# Patient Record
Sex: MALE | NOT HISPANIC OR LATINO | URBAN - METROPOLITAN AREA
[De-identification: names, ages, dates, MRNs, and addresses within clinical notes are randomized per-mention and may not be internally consistent; named-entity substitution may affect disease eponyms.]

---
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
## 2025-01-10 ENCOUNTER — TELEPHONE (OUTPATIENT)
Dept: UROLOGY | Facility: CLINIC | Age: 20
End: 2025-01-10

## 2025-01-10 ENCOUNTER — OFFICE VISIT (OUTPATIENT)
Dept: UROLOGY | Facility: CLINIC | Age: 20
End: 2025-01-10
Payer: COMMERCIAL

## 2025-01-10 VITALS
HEIGHT: 73 IN | HEART RATE: 93 BPM | WEIGHT: 279.4 LBS | BODY MASS INDEX: 37.03 KG/M2 | DIASTOLIC BLOOD PRESSURE: 76 MMHG | OXYGEN SATURATION: 98 % | SYSTOLIC BLOOD PRESSURE: 116 MMHG

## 2025-01-10 DIAGNOSIS — N48.1 BALANITIS: Primary | ICD-10-CM

## 2025-01-10 PROCEDURE — 99213 OFFICE O/P EST LOW 20 MIN: CPT

## 2025-01-10 RX ORDER — CLOTRIMAZOLE 1 %
CREAM (GRAM) TOPICAL 2 TIMES DAILY
Qty: 12 G | Refills: 1 | Status: SHIPPED | OUTPATIENT
Start: 2025-01-10

## 2025-01-10 NOTE — PROGRESS NOTES
Office Visit- Urology  Celso Middleton 2005 MRN: [id number]      Assessment/Discussion/Plan    19 y.o. male managed by     Foreskin concern  -Patient was previously seen in 2023 due to difficulty retracting foreskin.  On physical exam at that point in time no overt abnormality was appreciated elective circumcision was discussed  the patient decided to observe  -However he states he has had 2 episodes where he retracted the foreskin and then had difficulty with reducing it back over the glans.  He states the last episode happened in was persistent for 2 days at the foreskin swelled.  From patient description this seems to be consistent with a paraphimosis   -on exam today there seems to be no significant difficulty with retracting or reducing foreskin.  There is whitish discharge noted circumferentially along the coronal sulcus.  Patient states that he took a shower just before this and clean the area.  Possible yeast/candidal infection?  Patient not diabetic will trial a course of clotrimazole.  Advised on dietary factors and hygienic measures  -Patient interested in surgery.  Discussed circumcision,  postop expectations, risks (outlined bleeding, infection, sensation changes, cosmetic changes/scarring, removal of too much or too little tissue, need for additional procedures), and informed consent was signed Patient also states that when the foreskin is pulled back there can be associated pain.  Discussed possible implication  of the frenulum.  Offered evaluation with surgeon to have in-depth discussion given patient's interest in  surgery and whether frenulum that would be involved or not but patient was ready to commit to proceeding with circumcision at today's appointment.  However after speak with the surgical coordinator there would be no availability for a circumcision at the requested location and within the timing patient is requesting before he returns back to school.  Thus we will have patient return to  meet with surgeon when he returns from school in may.  He is aware that he has difficulty of producing a retracted foreskin back over the glans in the future that this can represent a urologic emergency and should either make our office aware presents to the ER  -Follow-up with surgeon in May       Chief Complaint:   Celso is a 19 y.o. male presenting to the office for a follow up visit regarding  foreskin concern        Subjective    Hx 6/16/2023  -18-year-old male who presents with his father  -Concerned about foreskin   -States that he has difficulty pulling the foreskin all the way back and that he is associated pain and discomfort as well as the fact that he senses difficulty reducing the foreskin back over glans  -He also felt like his urethra evaluated because on the right side there is more visibility as opposed to the left side of the mucosal tissue  -He has no urinary symptoms whatsoever: No dysuria, gross hematuria, frequency, urgency, obstructive urinary symptoms  -He denies any penile lesions  -He states for hygiene in the shower he retracts foreskin cleaned with soapy water and reduce his foreskin back over the glans    Hx 1/10/2024    Patient presents to the office today accompanied by his mother.  He states that he is having persistent concerns about his foreskin.  He notes 2 episodes where he had retracted the foreskin but then had difficulty with reducing a back over the glans the last episode lasted for about 2 days and he did note swelling of the glans/foreskin.  He states they believe that sometimes the glans is too large for him to be able to easily retract or reduce the foreskin.  No skin color changes.  No lesions that he have noticed.  He denies any penile pain.  Sometimes with retraction of foreskin there is tightness in the bottom aspect of the penis.  Had hemoglobin A1c in March that returned within normal limits    ROS:   Review of Systems   Constitutional: Negative.  Negative for chills,  fatigue and fever.   HENT: Negative.     Respiratory:  Negative for shortness of breath.    Cardiovascular:  Negative for chest pain.   Gastrointestinal: Negative.  Negative for abdominal pain.   Endocrine: Negative.    Musculoskeletal: Negative.    Skin: Negative.    Neurological: Negative.  Negative for dizziness and light-headedness.   Hematological: Negative.    Psychiatric/Behavioral: Negative.           Past Medical History  Past Medical History:   Diagnosis Date    Allergic     Asthma     Mild intermittent asthma without complication        Past Surgical History  Past Surgical History:   Procedure Laterality Date    NASAL SEPTUM SURGERY      WISDOM TOOTH EXTRACTION         Past Family History  Family History   Problem Relation Age of Onset    No Known Problems Mother     Diabetes type II Father     Hyperlipidemia Father     Hypertension Father     Diabetes Father     Heart disease Paternal Grandfather     Diabetes Paternal Grandmother        Past Social history  Social History     Socioeconomic History    Marital status: Single     Spouse name: Not on file    Number of children: Not on file    Years of education: Not on file    Highest education level: Not on file   Occupational History    Not on file   Tobacco Use    Smoking status: Never    Smokeless tobacco: Never   Vaping Use    Vaping status: Never Used   Substance and Sexual Activity    Alcohol use: Never    Drug use: Never    Sexual activity: Never     Partners: Female   Other Topics Concern    Not on file   Social History Narrative    Not on file     Social Drivers of Health     Financial Resource Strain: Not on file   Food Insecurity: Not on file   Transportation Needs: Not on file   Physical Activity: Not on file   Stress: Not on file   Social Connections: Not on file   Intimate Partner Violence: Not on file   Housing Stability: Not on file       Current Medications  Current Outpatient Medications   Medication Sig Dispense Refill    albuterol  (PROVENTIL HFA,VENTOLIN HFA) 90 mcg/act inhaler Inhale 2 puffs      Loratadine (Claritin) 10 MG CAPS        No current facility-administered medications for this visit.       Allergies  Allergies   Allergen Reactions    Pollen Extract Allergic Rhinitis       OBJECTIVE    Vitals   There were no vitals filed for this visit.    PVR:    Physical Exam  Constitutional:       General: He is not in acute distress.     Appearance: Normal appearance. He is normal weight. He is not ill-appearing or toxic-appearing.   HENT:      Head: Normocephalic and atraumatic.   Eyes:      Conjunctiva/sclera: Conjunctivae normal.   Cardiovascular:      Rate and Rhythm: Normal rate.   Pulmonary:      Effort: Pulmonary effort is normal. No respiratory distress.   Genitourinary:     Comments: Uncircumcised phallus.  No lesions noted on foreskin.  Foreskin is able to be reduced without difficulty.  Glans was examined with no lesions.  Did have whitish discharge circumferentially around the coronal sulcus or immediately behind it.  Skin:     General: Skin is warm and dry.   Neurological:      General: No focal deficit present.      Mental Status: He is alert and oriented to person, place, and time.      Cranial Nerves: No cranial nerve deficit.   Psychiatric:         Mood and Affect: Mood normal.         Behavior: Behavior normal.         Thought Content: Thought content normal.          Labs:     Lab Results   Component Value Date    CREATININE 0.90 03/14/2024      Lab Results   Component Value Date    HGBA1C 5.4 03/14/2024     Lab Results   Component Value Date    CALCIUM 9.9 03/14/2024    K 4.3 03/14/2024    CO2 29 03/14/2024     03/14/2024    BUN 9 03/14/2024    CREATININE 0.90 03/14/2024       I have personally reviewed all pertinent lab results and reviewed with patient    Imaging       Scot Hernandez PA-C  Date: 1/10/2025 Time: 11:01 AM  [facility]    This note was written using fluency dictation software.  Please excuse any resulting minor grammatical errors.

## 2025-04-29 ENCOUNTER — OFFICE VISIT (OUTPATIENT)
Dept: UROLOGY | Facility: CLINIC | Age: 20
End: 2025-04-29
Payer: COMMERCIAL

## 2025-04-29 VITALS
HEIGHT: 73 IN | BODY MASS INDEX: 38.3 KG/M2 | WEIGHT: 289 LBS | OXYGEN SATURATION: 99 % | SYSTOLIC BLOOD PRESSURE: 124 MMHG | HEART RATE: 83 BPM | DIASTOLIC BLOOD PRESSURE: 80 MMHG

## 2025-04-29 DIAGNOSIS — N47.8 FORESKIN PROBLEM: Primary | ICD-10-CM

## 2025-04-29 PROCEDURE — 99213 OFFICE O/P EST LOW 20 MIN: CPT | Performed by: UROLOGY

## 2025-04-29 NOTE — PROGRESS NOTES
Celso Middleton is a(n) 20 y.o. male. , :  2005    Subjective     Assessment:  The encounter diagnosis was Foreskin problem.  No need for circ despite couple episodes of paraphimosis.   Here with mom for all of the visit except the brief exam. Unsure why the events occurred,.  Not sure why rarely has frenular pain.  Physical exam today was unremarkable.  Explained to the patient and the mother that circumcision has not in and of itself a complicated procedure.  The issues are more related to postoperative hyperesthesia of the glans and possible meatal stenosis which occurs after circumcision in young adults.    Plan:  Patient prefers to hold on circ.  Due to the risks of hyperesthesia and meatal stenosis and he will call if the paraphimosis recurs.  Same as true for the frenular discomfort.  With nothing physically visible, generally operating on some area that is so sensitive leads to more discomfort.     Radiology  none    Labs   nnoe    Past Medical History  none    Past  History  Paraphimosis, unexplained    Past  surgical history   none    Prior Visits  Hx 2023  -18-year-old male who presents with his father  -Concerned about foreskin   -States that he has difficulty pulling the foreskin all the way back and that he is associated pain and discomfort as well as the fact that he senses difficulty reducing the foreskin back over glans  -He also felt like his urethra evaluated because on the right side there is more visibility as opposed to the left side of the mucosal tissue  -He has no urinary symptoms whatsoever: No dysuria, gross hematuria, frequency, urgency, obstructive urinary symptoms  -He denies any penile lesions  -He states for hygiene in the shower he retracts foreskin cleaned with soapy water and reduce his foreskin back over the glans     Hx 1/10/2024     Patient presents to the office today accompanied by his mother.  He states that he is having persistent concerns about his foreskin.  " He notes 2 episodes where he had retracted the foreskin but then had difficulty with reducing a back over the glans the last episode lasted for about 2 days and he did note swelling of the glans/foreskin.  He states they believe that sometimes the glans is too large for him to be able to easily retract or reduce the foreskin.  No skin color changes.  No lesions that he have noticed.  He denies any penile pain.  Sometimes with retraction of foreskin there is tightness in the bottom aspect of the penis.  Had hemoglobin A1c in March that returned within normal limits       5/2/2025 OV Logan Duron  Here for evaluation and for discussion about possible circumcision.  Physical examination performed.  No evidence of paraphimosis or phimosis.  No frenular masses present.  No tenderness or redness.    Review of Systems    No results found for: \"PSA\"  No results found for: \"TESTOSTERONE\"  No components found for: \"CR\"  No results found for: \"HBA1C\"    Objective     /80 (BP Location: Left arm, Patient Position: Sitting, Cuff Size: Adult)   Pulse 83   Ht 6' 1\" (1.854 m)   Wt 131 kg (289 lb)   SpO2 99%   BMI 38.13 kg/m²     Physical Exam  Cardiovascular:      Rate and Rhythm: Normal rate.   Genitourinary:     Penis: Normal.       Comments: Uncircumcised.  Foreskin comes back nicely.  Has pearly penile papules.  Neurological:      Mental Status: He is alert.           Logan Duron [facility]  "

## 2025-05-02 PROBLEM — N47.8 FORESKIN PROBLEM: Status: ACTIVE | Noted: 2025-05-02

## 2025-06-15 ENCOUNTER — PATIENT MESSAGE (OUTPATIENT)
Dept: FAMILY MEDICINE CLINIC | Facility: CLINIC | Age: 20
End: 2025-06-15

## 2025-06-18 ENCOUNTER — OFFICE VISIT (OUTPATIENT)
Dept: FAMILY MEDICINE CLINIC | Facility: CLINIC | Age: 20
End: 2025-06-18
Payer: COMMERCIAL

## 2025-06-18 ENCOUNTER — TELEPHONE (OUTPATIENT)
Dept: FAMILY MEDICINE CLINIC | Facility: CLINIC | Age: 20
End: 2025-06-18

## 2025-06-18 VITALS
SYSTOLIC BLOOD PRESSURE: 118 MMHG | HEART RATE: 82 BPM | HEIGHT: 73 IN | WEIGHT: 297 LBS | DIASTOLIC BLOOD PRESSURE: 80 MMHG | OXYGEN SATURATION: 97 % | BODY MASS INDEX: 39.36 KG/M2

## 2025-06-18 DIAGNOSIS — E66.9 OBESITY (BMI 30-39.9): Primary | ICD-10-CM

## 2025-06-18 DIAGNOSIS — Z78.9 WEIGHT LOSS ADVISED: ICD-10-CM

## 2025-06-18 DIAGNOSIS — R68.89 DIFFICULTY LOSING WEIGHT: ICD-10-CM

## 2025-06-18 DIAGNOSIS — J45.20 MILD INTERMITTENT ASTHMA WITHOUT COMPLICATION: ICD-10-CM

## 2025-06-18 DIAGNOSIS — E66.9 OBESITY (BMI 30-39.9): ICD-10-CM

## 2025-06-18 PROCEDURE — 99213 OFFICE O/P EST LOW 20 MIN: CPT | Performed by: FAMILY MEDICINE

## 2025-06-18 RX ORDER — SEMAGLUTIDE 0.25 MG/.5ML
INJECTION, SOLUTION SUBCUTANEOUS
Qty: 2 ML | Refills: 0 | Status: SHIPPED | OUTPATIENT
Start: 2025-06-18

## 2025-06-18 RX ORDER — SEMAGLUTIDE 0.25 MG/.5ML
INJECTION, SOLUTION SUBCUTANEOUS
Qty: 2 ML | Refills: 0 | Status: SHIPPED | OUTPATIENT
Start: 2025-06-18 | End: 2025-06-18 | Stop reason: SDUPTHER

## 2025-06-18 NOTE — PROGRESS NOTES
Name: Celso Middleton      : 2005      MRN: [id number]  Encounter Provider: Chapito Braga DO  Encounter Date: 2025   Encounter department: [facility]  :  Assessment & Plan  Obesity (BMI 30-39.9)  Weight loss advised  Difficulty losing weight  According to the patient he has been struggling with weight loss.  As noted below his weight is at 297 pounds on presentation today.  He has exercised and diet it for over 6 months.  Unfortunately he still remains at around at the same weight or higher.  We reviewed the pros and cons of considering Wegovy or other GLP-1's.  He understands the risks and the benefits.  He declines any cancer of thyroid in the family.  He is to call after his third shot to inform me that he needs a refill and also if he would like to go up on the dosage.  We did discuss that he typically will need to go up 1 dose per month, but this can be slowed if he has significant symptoms.  Orders:  •  Semaglutide-Weight Management (Wegovy) 0.25 MG/0.5ML; Inject 0.25 mg under the skin weekly    Mild intermittent asthma without complication  Patient has history of mild intermittent asthma.  We did not discuss this necessarily in the office, but I did review his chart.  He currently is on Claritin and albuterol as needed for symptoms.  He has not required the albuterol frequently over the course of the last several years.              History of Present Illness   Patient presents today to follow-up for weight loss.  Unfortunately the patient has been attempting for almost over a year to increase his exercise, watch his nutrition, and decrease portion sizes.  Unfortunately he is been attempting, but has not been successful.  He finds that he might maintain his weight or increase or decrease by 1 to 2 pounds, but always around the sac point that he is at currently.  He is at 297 pounds on presentation today.  He has at least been trying to exercise/diet for 6 months.  He  "has not used any of the apps or programs for dieting, but he has been trying to cut back on portion sizes and carbs.  He denies any significant history of thyroid cancer in his family, and another family member is taking the medication as well.    We reviewed the pros and cons of the medication and the likely long duration that he will be on this medication.  We discussed that this is likely going to be a chronic med which she will need to take for multiple years and/or lifetime if he would like to maintain the weight loss that he has.  I did review the possibility of oral medications including individual and combinations of Wellbutrin, naltrexone, phentermine, Topamax, and metformin.  He was not interested in oral medications, preferring the injectables.  He would like to get down around 20 to 30 pounds which is the approximate average of the GLP-1's.  He understands the side effects especially pancreatitis and gastroparesis.  If these occur he is to discontinue the medication immediately and let me know.    Review of Systems    Objective   /80   Pulse 82   Ht 6' 1\" (1.854 m)   Wt 135 kg (297 lb)   SpO2 97%   BMI 39.18 kg/m²      Physical Exam  No PE performed  "

## 2025-06-18 NOTE — TELEPHONE ENCOUNTER
PA for Semaglutide-Weight Management (Wegovy) 0.25 MG/0.5ML SUBMITTED to [facility]    via    []CMM-KEY:    [x]Surescripts-Case ID #  [id number]   []Availity-Auth ID #  NDC #    []Faxed to plan   []Other website    []Phone call Case ID #      [x]PA sent as URGENT    All office notes, labs and other pertaining documents and studies sent. Clinical questions answered. Awaiting determination from insurance company.     Turnaround time for your insurance to make a decision on your Prior Authorization can take 7-21 business days.

## 2025-06-18 NOTE — TELEPHONE ENCOUNTER
Message sent via [employer].     Just wanted the Zonbo Mediay to be sent to [facility] and can you please confirm once you send it?  Thanks,  Celso Middleton

## 2025-06-18 NOTE — ASSESSMENT & PLAN NOTE
Patient has history of mild intermittent asthma.  We did not discuss this necessarily in the office, but I did review his chart.  He currently is on Claritin and albuterol as needed for symptoms.  He has not required the albuterol frequently over the course of the last several years.

## 2025-06-18 NOTE — TELEPHONE ENCOUNTER
"PA has been started for the patient by the pharmacy for Wegovy 0.25mg/0.5ml Auto-injectors.    Login to [email or web address] and click \"enter a key\".    Enter the patient's last name, date of birth and the key.    Key: [id number]    Complete the PA and click \"send to plan\" for approval.      PA request scanned in.  "

## 2025-06-18 NOTE — TELEPHONE ENCOUNTER
PA for     Semaglutide-Weight Management (Wegovy) 0.25 MG/0.5ML   APPROVED     Date(s) approved 6/18/2025 - 12/18/2025    Case # [id number]    Patient advised by          []MyChart Message  [x]Phone call   []LMOM  []L/M to call office as no active Communication consent on file  []Unable to leave detailed message as VM not approved on Communication consent       Pharmacy advised by    [x]Fax  []Phone call  []Secure Chat    Specialty Pharmacy    []      Approval letter scanned into Media Yes

## 2025-07-07 ENCOUNTER — PATIENT MESSAGE (OUTPATIENT)
Dept: FAMILY MEDICINE CLINIC | Facility: CLINIC | Age: 20
End: 2025-07-07

## 2025-07-07 DIAGNOSIS — R68.89 DIFFICULTY LOSING WEIGHT: ICD-10-CM

## 2025-07-07 DIAGNOSIS — Z78.9 WEIGHT LOSS ADVISED: ICD-10-CM

## 2025-07-07 DIAGNOSIS — E66.811 CLASS 1 OBESITY DUE TO EXCESS CALORIES WITHOUT SERIOUS COMORBIDITY WITH BODY MASS INDEX (BMI) OF 33.0 TO 33.9 IN ADULT: Primary | ICD-10-CM

## 2025-07-07 DIAGNOSIS — E66.9 OBESITY (BMI 30-39.9): ICD-10-CM

## 2025-07-07 DIAGNOSIS — E66.09 CLASS 1 OBESITY DUE TO EXCESS CALORIES WITHOUT SERIOUS COMORBIDITY WITH BODY MASS INDEX (BMI) OF 33.0 TO 33.9 IN ADULT: Primary | ICD-10-CM

## 2025-07-07 RX ORDER — SEMAGLUTIDE 0.5 MG/.5ML
INJECTION, SOLUTION SUBCUTANEOUS
Qty: 2 ML | Refills: 0 | Status: SHIPPED | OUTPATIENT
Start: 2025-07-07

## 2025-08-05 ENCOUNTER — PATIENT MESSAGE (OUTPATIENT)
Dept: FAMILY MEDICINE CLINIC | Facility: CLINIC | Age: 20
End: 2025-08-05

## 2025-08-05 DIAGNOSIS — E66.811 CLASS 1 OBESITY DUE TO EXCESS CALORIES WITHOUT SERIOUS COMORBIDITY WITH BODY MASS INDEX (BMI) OF 33.0 TO 33.9 IN ADULT: Primary | ICD-10-CM

## 2025-08-05 DIAGNOSIS — R68.89 DIFFICULTY LOSING WEIGHT: ICD-10-CM

## 2025-08-05 DIAGNOSIS — Z78.9 WEIGHT LOSS ADVISED: ICD-10-CM

## 2025-08-05 DIAGNOSIS — E66.9 OBESITY (BMI 30-39.9): ICD-10-CM

## 2025-08-05 DIAGNOSIS — E66.09 CLASS 1 OBESITY DUE TO EXCESS CALORIES WITHOUT SERIOUS COMORBIDITY WITH BODY MASS INDEX (BMI) OF 33.0 TO 33.9 IN ADULT: Primary | ICD-10-CM

## 2025-08-08 RX ORDER — SEMAGLUTIDE 1 MG/.5ML
INJECTION, SOLUTION SUBCUTANEOUS
Qty: 2 ML | Refills: 0 | Status: SHIPPED | OUTPATIENT
Start: 2025-08-08